# Patient Record
Sex: MALE | Race: WHITE | NOT HISPANIC OR LATINO | URBAN - METROPOLITAN AREA
[De-identification: names, ages, dates, MRNs, and addresses within clinical notes are randomized per-mention and may not be internally consistent; named-entity substitution may affect disease eponyms.]

---

## 2017-07-13 ENCOUNTER — EMERGENCY (EMERGENCY)
Facility: HOSPITAL | Age: 62
LOS: 0 days | Discharge: LEFT BEFORE DISCHARGE | End: 2017-07-14

## 2017-07-13 DIAGNOSIS — M54.5 LOW BACK PAIN: ICD-10-CM

## 2017-07-13 DIAGNOSIS — Z79.899 OTHER LONG TERM (CURRENT) DRUG THERAPY: ICD-10-CM

## 2017-07-13 DIAGNOSIS — Z79.82 LONG TERM (CURRENT) USE OF ASPIRIN: ICD-10-CM

## 2017-07-13 DIAGNOSIS — E78.00 PURE HYPERCHOLESTEROLEMIA, UNSPECIFIED: ICD-10-CM

## 2017-07-13 DIAGNOSIS — I10 ESSENTIAL (PRIMARY) HYPERTENSION: ICD-10-CM

## 2021-08-10 ENCOUNTER — EMERGENCY (EMERGENCY)
Facility: HOSPITAL | Age: 66
LOS: 0 days | Discharge: HOME | End: 2021-08-10
Attending: STUDENT IN AN ORGANIZED HEALTH CARE EDUCATION/TRAINING PROGRAM | Admitting: STUDENT IN AN ORGANIZED HEALTH CARE EDUCATION/TRAINING PROGRAM
Payer: MEDICARE

## 2021-08-10 VITALS
TEMPERATURE: 97 F | SYSTOLIC BLOOD PRESSURE: 153 MMHG | DIASTOLIC BLOOD PRESSURE: 83 MMHG | OXYGEN SATURATION: 100 % | HEART RATE: 60 BPM | RESPIRATION RATE: 18 BRPM

## 2021-08-10 VITALS — WEIGHT: 154.98 LBS | HEIGHT: 66 IN

## 2021-08-10 DIAGNOSIS — M25.532 PAIN IN LEFT WRIST: ICD-10-CM

## 2021-08-10 DIAGNOSIS — I10 ESSENTIAL (PRIMARY) HYPERTENSION: ICD-10-CM

## 2021-08-10 DIAGNOSIS — L53.9 ERYTHEMATOUS CONDITION, UNSPECIFIED: ICD-10-CM

## 2021-08-10 PROCEDURE — 99283 EMERGENCY DEPT VISIT LOW MDM: CPT | Mod: GC

## 2021-08-10 RX ORDER — CEPHALEXIN 500 MG
1 CAPSULE ORAL
Qty: 28 | Refills: 0
Start: 2021-08-10 | End: 2021-08-16

## 2021-08-10 NOTE — ED ADULT TRIAGE NOTE - CHIEF COMPLAINT QUOTE
woke up with pain and swelling to L wrist with red line going up inner aspect of arm. Denies injury to arm

## 2021-08-10 NOTE — ED PROVIDER NOTE - OBJECTIVE STATEMENT
66M hx HTN presents with left wrist pain. Patient states he woke up today, had mild redness on his volar wrist that ran down the length of the forearm, increased pain on /flexion/extension/supination/pronation, denies numbness/tingling/trauma to the area, no fevers/chills/nausea/vomiting/chest pain/shortness of breath.

## 2021-08-10 NOTE — ED PROVIDER NOTE - PATIENT PORTAL LINK FT
You can access the FollowMyHealth Patient Portal offered by [facility] by registering at the following website: [email or web address]. By joining [employer]’s FollowMyHealth portal, you will also be able to view your health information using other applications (apps) compatible with our system.

## 2021-08-10 NOTE — ED PROVIDER NOTE - NSFOLLOWUPINSTRUCTIONS_ED_ALL_ED_FT
Please take antibiotics every 6 hours for the next week, and follow-up with your primary care doctor.     Cellulitis    Cellulitis is a skin infection caused by bacteria. This condition occurs most often in the arms and lower legs but can occur anywhere over the body. Symptoms include redness, swelling, warm skin, tenderness, and chills/fever. If you were prescribed an antibiotic medicine, take it as told by your health care provider. Do not stop taking the antibiotic even if you start to feel better.    SEEK IMMEDIATE MEDICAL CARE IF YOU HAVE ANY OF THE FOLLOWING SYMPTOMS: worsening fever, red streaks coming from affected area, vomiting or diarrhea, or dizziness/lightheadedness.

## 2021-08-10 NOTE — ED PROVIDER NOTE - CARE PROVIDER_API CALL
MELISSA LAU  [id number]  [address] [place] [address]  Phone: [phone]  Fax: ([phone]  Established Patient  Follow Up Time: 4-6 Days

## 2021-08-10 NOTE — ED PROVIDER NOTE - PHYSICAL EXAMINATION
Vital Signs: I have reviewed the initial vital signs.  Constitutional: well-nourished, appears stated age, no acute distress.  HEENT: Airway patent, moist MM, no erythema/swelling/deformity of oral structures. EOMI, PERRLA.  CV: regular rate, regular rhythm, well-perfused extremities, 2+ b/l DP and radial pulses equal.  Lungs: BCTA, no increased WOB.  ABD: NTND, no guarding or rebound, no pulsatile mass, no hernias, no flank pain.   MSK: Neck supple, nontender, nl ROM, no stepoff. Chest nontender. Back nontender in TLS spine or to b/l bony structures. L forearm demonstrates mild area of erythema on volar wrist, ttp over the distal radius, pain with flexion/extension/wrist supination/pronation.   INTEG: Skin warm, dry, no rash.  NEURO: A&Ox3, moving all extremities, normal speech  PSYCH: Calm, cooperative, normal affect and interaction.

## 2021-08-10 NOTE — ED PROVIDER NOTE - PROGRESS NOTE DETAILS
Patient refused Xrays, is willing to take abx and understands need to f/u with pmd. Patient to be discharged from ED. Any available test results were discussed with patient and/or family. Verbal instructions given, including instructions to return to ED immediately for any new, worsening, or concerning symptoms. Patient endorsed understanding. Written discharge instructions additionally given, including follow-up plan.

## 2021-08-10 NOTE — ED PROVIDER NOTE - INTERNATIONAL TRAVEL
1:59 PM    Reason for Call: Phone Call    Description: Pt called and would like to speak with Izzy. Call back      Was an appointment offered for this call? No  If yes : Appointment type              Date    Preferred method for responding to this message: Telephone Call  What is your phone number ?  [phone] Nilda     If we cannot reach you directly, may we leave a detailed response at the number you provided? Yes    Can this message wait until your PCP/provider returns, if available today? Not applicable, PCP is in     [facility] Ailyn Gibson    
Addressed in another telephone encounter. See Chart. Ashley A. Lechevalier, LPN on 10/14/2019 at 2:10 PM    
No

## 2022-07-08 ENCOUNTER — EMERGENCY (EMERGENCY)
Facility: HOSPITAL | Age: 67
LOS: 0 days | Discharge: HOME | End: 2022-07-08
Attending: EMERGENCY MEDICINE | Admitting: EMERGENCY MEDICINE

## 2022-07-08 VITALS
RESPIRATION RATE: 18 BRPM | DIASTOLIC BLOOD PRESSURE: 81 MMHG | HEART RATE: 86 BPM | TEMPERATURE: 98 F | OXYGEN SATURATION: 97 % | SYSTOLIC BLOOD PRESSURE: 146 MMHG | WEIGHT: 149.03 LBS | HEIGHT: 66 IN

## 2022-07-08 DIAGNOSIS — M70.32 OTHER BURSITIS OF ELBOW, LEFT ELBOW: ICD-10-CM

## 2022-07-08 DIAGNOSIS — M25.422 EFFUSION, LEFT ELBOW: ICD-10-CM

## 2022-07-08 PROCEDURE — 10160 PNXR ASPIR ABSC HMTMA BULLA: CPT

## 2022-07-08 PROCEDURE — 99283 EMERGENCY DEPT VISIT LOW MDM: CPT | Mod: 25

## 2022-07-08 RX ORDER — CEPHALEXIN 500 MG
500 CAPSULE ORAL ONCE
Refills: 0 | Status: COMPLETED | OUTPATIENT
Start: 2022-07-08 | End: 2022-07-08

## 2022-07-08 RX ORDER — CEPHALEXIN 500 MG
1 CAPSULE ORAL
Qty: 40 | Refills: 0
Start: 2022-07-08 | End: 2022-07-17

## 2022-07-08 RX ADMIN — Medication 500 MILLIGRAM(S): at 22:14

## 2022-07-08 NOTE — ED PROVIDER NOTE - CLINICAL SUMMARY MEDICAL DECISION MAKING FREE TEXT BOX
patient s/p drainage, no redness no pain to palpation noted from radial pulses 2 +=,   no redness, no warmth, no signs of infection with from Patient placed in ace wrap, I will discharge with follow up to ortho/pmd  patient has no pain to palpation and no trauma noted

## 2022-07-08 NOTE — ED PROVIDER NOTE - MUSCULOSKELETAL MINIMAL EXAM
patient has swelling to the left olecranon no signs of infection radial pulses 2 + most consistent with bursitis

## 2022-07-08 NOTE — ED PROVIDER NOTE - OBJECTIVE STATEMENT
Patient is a 67-year-old male who presents for evaluation of left elbow swelling after he noticed a collection of fluid buildup after prolonged flexion no fevers no chills no vomiting he has full range of motion denies any other trauma denies any repetitive motion

## 2022-07-08 NOTE — ED PROVIDER NOTE - NS ED ROS FT
patient has no pain to palpation of the elbow from radial pulses 2 += no redness no warmth from of the fingers and wrist no other pain noted no signs of infection

## 2022-07-08 NOTE — ED PROVIDER NOTE - NSFOLLOWUPINSTRUCTIONS_ED_ALL_ED_FT
SEE YOUR DOCTOR IN THE NEXT 24-48 HOURS   RETURN FOR ANY FURTHER CONCERNS    take motrin every 4-6 hours for pain   keep ACE wrap in place as much as possible   use Rest ICE Compression     you do not have evidence of infection the following is for informational purposes only  Cellulitis    Cellulitis is a skin infection caused by bacteria. This condition occurs most often in the arms and lower legs but can occur anywhere over the body. Symptoms include redness, swelling, warm skin, tenderness, and chills/fever. If you were prescribed an antibiotic medicine, take it as told by your health care provider. Do not stop taking the antibiotic even if you start to feel better.    SEEK IMMEDIATE MEDICAL CARE IF YOU HAVE ANY OF THE FOLLOWING SYMPTOMS: worsening fever, red streaks coming from affected area, vomiting or diarrhea, or dizziness/lightheadedness.

## 2022-07-08 NOTE — ED PROVIDER NOTE - PATIENT PORTAL LINK FT
You can access the FollowMyHealth Patient Portal offered by [facility] by registering at the following website: [email or web address]. By joining [employer]’s FollowMyHealth portal, you will also be able to view your health information using other applications (apps) compatible with our system.

## 2022-08-02 ENCOUNTER — EMERGENCY (EMERGENCY)
Facility: HOSPITAL | Age: 67
LOS: 0 days | Discharge: HOME | End: 2022-08-02
Attending: EMERGENCY MEDICINE | Admitting: EMERGENCY MEDICINE

## 2022-08-02 VITALS
HEART RATE: 95 BPM | DIASTOLIC BLOOD PRESSURE: 78 MMHG | HEIGHT: 66 IN | TEMPERATURE: 98 F | SYSTOLIC BLOOD PRESSURE: 118 MMHG | OXYGEN SATURATION: 99 % | RESPIRATION RATE: 18 BRPM

## 2022-08-02 DIAGNOSIS — F17.200 NICOTINE DEPENDENCE, UNSPECIFIED, UNCOMPLICATED: ICD-10-CM

## 2022-08-02 DIAGNOSIS — M70.32 OTHER BURSITIS OF ELBOW, LEFT ELBOW: ICD-10-CM

## 2022-08-02 PROCEDURE — 99283 EMERGENCY DEPT VISIT LOW MDM: CPT | Mod: GC

## 2022-08-02 NOTE — ED PROVIDER NOTE - PROGRESS NOTE DETAILS
CP: Patient recommended to follow up with orthopedics and patient verbalized understanding and agreement.

## 2022-08-02 NOTE — ED PROVIDER NOTE - OBJECTIVE STATEMENT
68 yo male, PMHx of left elbow bursitis, presents with left elbow fluid accumulation, worsening over time, no alleviating factors, no associated symptoms. Patient was seen in ED 7/9 for same issue, had it drained, and then it seemed to reaccumulate over time. He has not followed up with a doctor since. Denies fevers, chills, pain, restriction in motion, trauma, numbness, tingling.

## 2022-08-02 NOTE — ED PROVIDER NOTE - NS ED ROS FT
GEN:  no fever, no chills, no generalized weakness  MSK:  +left elbow swelling  SKIN:  no rash, no bruising

## 2022-08-02 NOTE — ED PROVIDER NOTE - CARE PROVIDER_API CALL
Parish Arshad)  [facility]  [address] [place] [address] [place] [address]  Phone: [phone]  Fax: [phone]  Follow Up Time: 1-3 Days

## 2022-08-02 NOTE — ED PROVIDER NOTE - PHYSICAL EXAMINATION
CONSTITUTIONAL: Well-developed; well-nourished; in no acute distress.   SKIN: warm, dry, no erythema or rash  HEAD: Normocephalic  EYES: no conjunctival injection  ENT: No nasal discharge  NECK: Supple  EXT: Normal ROM.  +fluctuance at left elbow, non-tender, full ROM. No clubbing, cyanosis or edema.   NEURO: Alert, oriented, grossly unremarkable.  PSYCH: Cooperative, appropriate.

## 2022-08-02 NOTE — ED PROVIDER NOTE - NSFOLLOWUPINSTRUCTIONS_ED_ALL_ED_FT
Elbow Bursitis    WHAT YOU NEED TO KNOW:    What is elbow bursitis? Elbow bursitis is inflammation of the bursa in your elbow. The bursa is a fluid-filled sac that acts as a cushion between a bone and a tendon. A tendon is a cord of strong tissue that connects muscles to bones. The bursa is located right under the point of your elbow.  Adult Arm Bones         What increases my risk for elbow bursitis?   •An injury, such as a fall      •Overuse of your elbow, such as when you play tennis, vacuum, or swing a hammer      •Pressure, such as when you lean on your elbow      •Bacterial infection      •Medical conditions, such as rheumatoid arthritis or gout      What are the signs and symptoms of elbow bursitis?   •Pain or tenderness when you touch or move your elbow      •Redness or swelling on or around the point of your elbow      •Decreased movement of your elbow      •Warmth of the skin over your elbow      •A grating or grinding sound or feeling when you move your elbow      How is elbow bursitis diagnosed? Your healthcare provider will examine your elbow and ask about your injury or activities. You may need any of the following:  •Blood tests may be used to check for signs of infection. Healthcare providers may also check for diseases that may be causing your bursitis, such as rheumatoid arthritis.      •X-ray or MRI pictures are used to check your elbow. You may be given contrast liquid to help your elbow show up better in the pictures. Tell the healthcare provider if you have ever had an allergic reaction to contrast liquid. Do not enter the MRI room with anything metal. Metal can cause serious injury. Tell the healthcare provider if you have any metal in or on your body.      •A sample of fluid from your elbow may tested for signs of infection.      How is elbow bursitis treated?   •Medicines: ?NSAIDs, such as ibuprofen, help decrease swelling, pain, and fever. NSAIDs can cause stomach bleeding or kidney problems in certain people. If you take blood thinner medicine, always ask your healthcare provider if NSAIDs are safe for you. Always read the medicine label and follow directions.      ?Aspirin helps relieve pain and swelling. Take aspirin exactly as directed by your healthcare provider.      ?Antibiotics help fight an infection caused by bacteria.      ?Steroids help decrease pain and swelling. Steroid injections are given directly into the painful area. Steroid pills may be given for a short time to relieve acute pain.      •Fluid aspiration is a procedure to remove fluid from the area. This may be done before you have a steroid injection.      •Surgery may be needed to remove your bursa or part of your elbow bone. Surgery is only done when other treatments do not work.      How can I manage elbow bursitis?   •Rest your elbow as much as possible to decrease pain and swelling. Slowly start to do more each day. Return to your daily activities as directed. Do not bend your elbow all the way or lift anything heavy while your elbow is healing. An occupational therapist can help you find ways to keep your elbow rested that will help with the type of work you do. For example, arm rests that do not touch the elbow can make it easier to work at a computer.      •Use an elbow pad while your elbow heals. An elbow pad will cushion and protect your elbow. Your healthcare provider can tell you the kind of elbow pad to use. He or she can also tell you how long to wear it each day, and for how many days to use it.      •Apply ice to help decrease swelling and pain. Use an ice pack, or put crushed ice in a plastic bag. Cover the bag with a towel before you place it on your elbow. Apply ice for 15 to 20 minutes, 3 to 4 times each day, as directed.      •Use compression to help decrease swelling. Healthcare providers may wrap your arm with tape or an elastic bandage. Loosen the elastic bandage if you start to lose feeling in your fingers.  How to Wrap an Elastic Bandage           •Elevate (raise) your elbow above the level of your heart as often as you can. This will help decrease swelling and pain. Prop your elbow on pillows or blankets to keep it elevated comfortably.             •Go to physical therapy, if directed. A physical therapist teaches you exercises to help improve movement and strength, and to decrease pain.      How can I prevent elbow bursitis?   •Avoid injury and pressure to your elbows. Wear elbow pads or protectors when you play sports. Do not lean on your elbows or clench your fists. Do not tightly  small items, such as tools or pens.      •Stretch, warm up, and cool down. Always stretch and do warmup and cool-down exercises before and after you exercise. This will help loosen your muscles and decrease stress on your elbows. Rest between workouts.      When should I call my doctor?   •Your pain and swelling increase.      •Your symptoms do not improve after 10 days of treatment.      •You have a fever.      •You have questions or concerns about your condition or care.      CARE AGREEMENT:    You have the right to help plan your care. Learn about your health condition and how it may be treated. Discuss treatment options with your healthcare providers to decide what care you want to receive. You always have the right to refuse treatment.

## 2022-08-02 NOTE — ED PROVIDER NOTE - PATIENT PORTAL LINK FT
You can access the FollowMyHealth Patient Portal offered by [facility] by registering at the following website: [email or web address]. By joining [employer]’s FollowMyHealth portal, you will also be able to view your health information using other applications (apps) compatible with our system.

## 2022-08-02 NOTE — ED PROVIDER NOTE - CLINICAL SUMMARY MEDICAL DECISION MAKING FREE TEXT BOX
pt with recurrent bursitis of left elbow. advised ortho f/u. no indication to drain in ed and will likely re-accumulate. pt has acewrap at home. will dc with ortho f/u

## 2022-08-09 NOTE — ED PROCEDURE NOTE - I&D DEPTH OF TISSUE DRAINED
Incoming pt, contacted pt at [facility]. Two patient Identifiers confirmed. Advised pt that the cardiologist from the hospital may consult with Dr. Clay Vance if needed. Explained to pt's wife that care team decision is [facility]. Pt verbalized understanding. skin

## 2022-09-16 ENCOUNTER — OUTPATIENT (OUTPATIENT)
Dept: OUTPATIENT SERVICES | Facility: HOSPITAL | Age: 67
LOS: 1 days | Discharge: HOME | End: 2022-09-16

## 2022-09-16 VITALS
OXYGEN SATURATION: 98 % | DIASTOLIC BLOOD PRESSURE: 82 MMHG | TEMPERATURE: 96 F | HEART RATE: 72 BPM | RESPIRATION RATE: 17 BRPM | WEIGHT: 125 LBS | SYSTOLIC BLOOD PRESSURE: 127 MMHG | HEIGHT: 66 IN

## 2022-09-16 VITALS — DIASTOLIC BLOOD PRESSURE: 65 MMHG | HEART RATE: 68 BPM | SYSTOLIC BLOOD PRESSURE: 126 MMHG | RESPIRATION RATE: 17 BRPM

## 2022-09-16 DIAGNOSIS — Z96.1 PRESENCE OF INTRAOCULAR LENS: Chronic | ICD-10-CM

## 2022-09-16 DIAGNOSIS — Z96.649 PRESENCE OF UNSPECIFIED ARTIFICIAL HIP JOINT: Chronic | ICD-10-CM

## 2022-09-16 DIAGNOSIS — Z98.890 OTHER SPECIFIED POSTPROCEDURAL STATES: Chronic | ICD-10-CM

## 2022-09-16 NOTE — ASU DISCHARGE PLAN (ADULT/PEDIATRIC) - NS MD DC FALL RISK RISK
For information on Fall & Injury Prevention, visit: [email or web address] OR  [email or web address] OR  https://www.cdc.gov/steadi/patient.html

## 2022-09-16 NOTE — ASU PATIENT PROFILE, ADULT - NSICDXPASTSURGICALHX_GEN_ALL_CORE_FT
PAST SURGICAL HISTORY:  History of back surgery     History of intraocular lens implant     Status post THR (total hip replacement)

## 2022-09-16 NOTE — ASU PATIENT PROFILE, ADULT - NSICDXPASTMEDICALHX_GEN_ALL_CORE_FT
medications PAST MEDICAL HISTORY:  2019 novel coronavirus disease (COVID-19)     High cholesterol     HTN (hypertension)

## 2022-09-16 NOTE — ASU PATIENT PROFILE, ADULT - FALL HARM RISK - UNIVERSAL INTERVENTIONS
Bed in lowest position, wheels locked, appropriate side rails in place/Call bell, personal items and telephone in reach/Instruct patient to call for assistance before getting out of bed or chair/Non-slip footwear when patient is out of bed/[place] to call system/Physically safe environment - no spills, clutter or unnecessary equipment/Purposeful Proactive Rounding/Room/bathroom lighting operational, light cord in reach

## 2022-09-20 DIAGNOSIS — H26.8 OTHER SPECIFIED CATARACT: ICD-10-CM

## 2022-12-28 ENCOUNTER — EMERGENCY (EMERGENCY)
Facility: HOSPITAL | Age: 67
LOS: 0 days | Discharge: HOME | End: 2022-12-28
Attending: EMERGENCY MEDICINE | Admitting: EMERGENCY MEDICINE
Payer: MEDICARE

## 2022-12-28 VITALS
DIASTOLIC BLOOD PRESSURE: 71 MMHG | SYSTOLIC BLOOD PRESSURE: 130 MMHG | OXYGEN SATURATION: 98 % | WEIGHT: 139.99 LBS | HEIGHT: 66 IN | RESPIRATION RATE: 18 BRPM | HEART RATE: 85 BPM | TEMPERATURE: 98 F

## 2022-12-28 DIAGNOSIS — Z96.649 PRESENCE OF UNSPECIFIED ARTIFICIAL HIP JOINT: Chronic | ICD-10-CM

## 2022-12-28 DIAGNOSIS — I10 ESSENTIAL (PRIMARY) HYPERTENSION: ICD-10-CM

## 2022-12-28 DIAGNOSIS — Z79.82 LONG TERM (CURRENT) USE OF ASPIRIN: ICD-10-CM
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
## 2022-12-28 DIAGNOSIS — R07.81 PLEURODYNIA: ICD-10-CM

## 2022-12-28 DIAGNOSIS — Z96.1 PRESENCE OF INTRAOCULAR LENS: Chronic | ICD-10-CM

## 2022-12-28 DIAGNOSIS — S22.32XA FRACTURE OF ONE RIB, LEFT SIDE, INITIAL ENCOUNTER FOR CLOSED FRACTURE: ICD-10-CM

## 2022-12-28 DIAGNOSIS — Z98.890 OTHER SPECIFIED POSTPROCEDURAL STATES: Chronic | ICD-10-CM

## 2022-12-28 DIAGNOSIS — E78.00 PURE HYPERCHOLESTEROLEMIA, UNSPECIFIED: ICD-10-CM

## 2022-12-28 DIAGNOSIS — Z96.1 PRESENCE OF INTRAOCULAR LENS: ICD-10-CM

## 2022-12-28 DIAGNOSIS — Z86.16 PERSONAL HISTORY OF COVID-19: ICD-10-CM

## 2022-12-28 DIAGNOSIS — Z98.890 OTHER SPECIFIED POSTPROCEDURAL STATES: ICD-10-CM
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
## 2022-12-28 DIAGNOSIS — Z96.649 PRESENCE OF UNSPECIFIED ARTIFICIAL HIP JOINT: ICD-10-CM

## 2022-12-28 PROBLEM — U07.1 COVID-19: Chronic | Status: ACTIVE | Noted: 2022-09-16

## 2022-12-28 PROCEDURE — 99284 EMERGENCY DEPT VISIT MOD MDM: CPT | Mod: FS

## 2022-12-28 PROCEDURE — 71101 X-RAY EXAM UNILAT RIBS/CHEST: CPT | Mod: 26,LT

## 2022-12-28 RX ORDER — ROSUVASTATIN CALCIUM 5 MG/1
0 TABLET ORAL
Qty: 0 | Refills: 0 | DISCHARGE

## 2022-12-28 RX ORDER — LISINOPRIL 2.5 MG/1
0 TABLET ORAL
Qty: 0 | Refills: 0 | DISCHARGE

## 2022-12-28 RX ORDER — ASPIRIN/CALCIUM CARB/MAGNESIUM 324 MG
0 TABLET ORAL
Qty: 0 | Refills: 0 | DISCHARGE

## 2022-12-28 RX ORDER — LOVASTATIN 20 MG
1 TABLET ORAL
Qty: 0 | Refills: 0 | DISCHARGE

## 2022-12-28 NOTE — ED PROVIDER NOTE - NS ED ATTENDING STATEMENT MOD
This was a shared visit with the FRANCISCO JAVIER. I reviewed and verified the documentation and independently performed the documented:

## 2022-12-28 NOTE — ED PROVIDER NOTE - NSFOLLOWUPINSTRUCTIONS_ED_ALL_ED_FT
Rib Contusion    A rib contusion is a deep bruise on your rib area. Contusions are the result of a blunt trauma that causes bleeding and injury to the tissues under the skin. A rib contusion may involve bruising of the ribs and of the skin and muscles in the area. The skin overlying the contusion may turn blue, purple, or yellow. Minor injuries will give you a painless contusion, but more severe contusions may stay painful and swollen for a few weeks.     CAUSES  A contusion is usually caused by a blow, trauma, or direct force to an area of the body. This often occurs while playing contact sports.    SYMPTOMS  Swelling and redness of the injured area.  Discoloration of the injured area.  Tenderness and soreness of the injured area.  Pain with or without movement.    DIAGNOSIS  The diagnosis can be made by taking a medical history and performing a physical exam. An X-ray, CT scan, or MRI may be needed to determine if there were any associated injuries, such as broken bones (fractures) or internal injuries.    TREATMENT  Often, the best treatment for a rib contusion is rest. Icing or applying cold compresses to the injured area may help reduce swelling and inflammation.    HOME CARE INSTRUCTIONS  Apply ice to the injured area:   Put ice in a plastic bag.   Place a towel between your skin and the bag.   Leave the ice on for 20 minutes, 2–3 times per day.   Take medicines only as directed by your health care provider.   Rest the injured area. Avoid strenuous activity and any activities or movements that cause pain. Be careful during activities and avoid bumping the injured area.  Perform deep-breathing exercises as directed by your health care provider.   Do not lift anything that is heavier than 5 lb (2.3 kg) until your health care provider approves.   Do not use any tobacco products, including cigarettes, chewing tobacco, or electronic cigarettes. If you need help quitting, ask your health care provider.     SEEK MEDICAL CARE IF:  You have increased bruising or swelling.   You have pain that is not controlled with treatment.   You have a fever.    SEEK IMMEDIATE MEDICAL CARE IF:  You have difficulty breathing or shortness of breath.   You develop a continual cough, or you cough up thick or bloody sputum.   You feel sick to your stomach (nauseous), you throw up (vomit), or you have abdominal pain.     ADDITIONAL NOTES AND INSTRUCTIONS    Please follow up with your Primary MD in 24-48 hr.  Seek immediate medical care for any new/worsening signs or symptoms. Rib fracture:  A rib fracture is a break or crack in one of the bones of the ribs. The ribs are like a cage that goes around your upper chest. A broken or cracked rib is often painful, but most do not cause other problems. Most rib fractures usually heal on their own in 1–3 months.    Follow these instructions at home:  Managing pain, stiffness, and swelling     If directed, apply ice to the injured area.    Put ice in a plastic bag.  Place a towel between your skin and the bag.  Leave the ice on for 20 minutes, 2–3 times a day.    Take over-the-counter and prescription medicines only as told by your doctor.  Activity     Avoid activities that cause pain to the injured area. Protect your injured area.  Slowly increase activity as told by your doctor.  General instructions     Do deep breathing as told by your doctor. You may be told to:    Take deep breaths many times a day.  Cough many times a day while hugging a pillow.  Use a device (incentive spirometer) to do deep breathing many times a day.    Drink enough fluid to keep your pee (urine) clear or pale yellow.  Do not wear a rib belt or binder. These do not allow you to breathe deeply.  Keep all follow-up visits as told by your doctor. This is important.  Contact a doctor if:  You have a fever.  Get help right away if:  You have trouble breathing.  You are short of breath.  You cannot stop coughing.  You cough up thick or bloody spit (sputum).  You feel sick to your stomach (nauseous), throw up (vomit), or have belly (abdominal) pain.  Your pain gets worse and medicine does not help.  Summary  A rib fracture is a break or crack in one of the bones of the ribs.  Apply ice to the injured area and take medicines for pain as told by your doctor.  Take deep breaths and cough many times a day. Hug a pillow every time you cough.

## 2022-12-28 NOTE — ED PROVIDER NOTE - CLINICAL SUMMARY MEDICAL DECISION MAKING FREE TEXT BOX
patient status post trip and fall with left lateral chest wall pain we obtained an x-ray which reveals 7 rib fracture initially patient was informed that this was likely related to soft tissue injury however after phone call with radiology noting seventh rib fracture patient was updated and discharged

## 2022-12-28 NOTE — ED PROVIDER NOTE - OBJECTIVE STATEMENT
67-year-old male presents to the ED with left-sided rib pain.  Patient states he tripped and fell landing on his left side about 2 days ago.  Patient states his side is still bothering him so he came.  Patient denies any shortness of breath

## 2022-12-28 NOTE — ED PROVIDER NOTE - NS ED ROS FT
Review of Systems:  	•	CONSTITUTIONAL - no fever, no diaphoresis, no chills  	•	SKIN - no rash    	•	EYES - no eye pain, no blurry vision  	•	ENT - no change in hearing, no sore throat, no ear pain or tinnitus  	•	RESPIRATORY - no shortness of breath, no cough  	•	CARDIAC - no chest pain, no palpitations Left-sided rib pain

## 2022-12-28 NOTE — ED ADULT NURSE NOTE - NSFALLRSKPSTHSTOCCUR_ED_ALL_ED
Acid reflux    Anxiety    Asthma    CVA (cerebral vascular accident)    Depression    Fatty pancreas    HLD (hyperlipidemia)    IGT (impaired glucose tolerance)    Mouth sores    PNA (pneumonia)    Pulmonary HTN    Ulcerative colitis
Single Mechanical/Accidental Fall

## 2022-12-28 NOTE — ED PROVIDER NOTE - PATIENT PORTAL LINK FT
You can access the FollowMyHealth Patient Portal offered by [facility] by registering at the following website: [email or web address]. By joining [employer]’s FollowMyHealth portal, you will also be able to view your health information using other applications (apps) compatible with our system.

## 2022-12-28 NOTE — ED PROVIDER NOTE - ATTENDING APP SHARED VISIT CONTRIBUTION OF CARE
Patient is a 67-year-old male presents to the emergency department left-sided rib pain status post trip and fall 48 hours prior to arrival landing on his left side patient continues to have pain which is moderate throbbing worse with movement denies any LOC denies any vomiting denies any other diaphoresis shortness of breath palpitations abdominal pain or back pain on physical exam patient has no signs of trauma normocephalic/atraumatic pupils equal round reactive light accommodation extraocular muscle intact oropharynx clear chest clear to oscillation bilaterally abdomen soft nontender nondistended bowel sounds positive no guarding no rebound extremities full range of motion patient is able ambulate well patient is point tender in the lateral aspect  between T5 and T7 no bruising noted        Assessment plan patient status post trip and fall with left lateral chest wall pain we obtained an x-ray which reveals 7 rib fracture initially patient was informed that this was likely related to soft tissue injury however after phone call with radiology noting seventh rib fracture patient was updated and discharged

## 2022-12-28 NOTE — ED PROVIDER NOTE - PHYSICAL EXAMINATION
--EXAM--  VITAL SIGNS: I have reviewed vs documented at present.  CONSTITUTIONAL: Well-developed; well-nourished; in no acute distress.     NECK: Supple; non tender.  CARD: S1, S2, Regular rate and rhythm.  there is mild tenderness over lower left ribs  RESP: No wheezes, rales or rhonchi.  ABD: Normal bowel sounds; soft; non-distended; non-tender.

## 2022-12-28 NOTE — ED ADULT NURSE NOTE - NSICDXPASTMEDICALHX_GEN_ALL_CORE_FT
PAST MEDICAL HISTORY:  2019 novel coronavirus disease (COVID-19)     High cholesterol     HTN (hypertension)

## 2023-05-30 NOTE — ED ADULT NURSE NOTE - NSFALLRSKASSESSDT_ED_ALL_ED
Hx of emphysema on inhalers as above- Trelegy and albuterol prn. 08-Jul-2022 22:18 Hx of emphysema on inhalers as above- Trelegy 1puff qd and albuterol prn.

## 2023-08-10 NOTE — ED PROVIDER NOTE - CROS ED [facility] ALL NEG
Pt with swelling, itching, redness, to R hand after a wasp bite 1 week ago. Swelling localized.  Denies respiratory symptoms - - -

## 2025-05-12 NOTE — ED ADULT TRIAGE NOTE - ARRIVAL FROM
Message received from Estephania Krause -    Can you work on this authorization? The patient wants to go to [facility].    Thank you!      Home

## 2025-06-06 NOTE — ED ADULT NURSE NOTE - PAIN: BODY LOCATION
PROVIDER:[TOKEN:[[id number]],SCHEDULEDAPPT:[06/17/2025],SCHEDULEDAPPTTIME:[08:00 PM],ESTABLISHEDPATIENT:[T]],PROVIDER:[TOKEN:[[id number]],FOLLOWUP:[2 weeks],ESTABLISHEDPATIENT:[T]]
left rib pain